# Patient Record
Sex: FEMALE | Race: WHITE | NOT HISPANIC OR LATINO | Employment: OTHER | URBAN - METROPOLITAN AREA
[De-identification: names, ages, dates, MRNs, and addresses within clinical notes are randomized per-mention and may not be internally consistent; named-entity substitution may affect disease eponyms.]

---

## 2023-11-09 ENCOUNTER — TREATMENT (OUTPATIENT)
Dept: PHYSICAL THERAPY | Facility: CLINIC | Age: 68
End: 2023-11-09
Payer: COMMERCIAL

## 2023-11-09 DIAGNOSIS — M54.40 CHRONIC BILATERAL LOW BACK PAIN WITH SCIATICA: ICD-10-CM

## 2023-11-09 DIAGNOSIS — M54.2 NECK PAIN: Primary | ICD-10-CM

## 2023-11-09 DIAGNOSIS — G89.29 CHRONIC BILATERAL LOW BACK PAIN WITH SCIATICA: ICD-10-CM

## 2023-11-09 PROCEDURE — 97110 THERAPEUTIC EXERCISES: CPT | Mod: GP

## 2023-11-09 PROCEDURE — 97164 PT RE-EVAL EST PLAN CARE: CPT | Mod: GP,59

## 2023-11-09 NOTE — PROGRESS NOTES
Physical Therapy Treatment    Patient Name: Megan Son  MRN: [id number]  Today's Date: 11/9/2023  Visit: 12/60 (1/5)    SUBJECTIVE:  Patient returns after vacation and time off due to insurance issues.  Plans to retire Feb 1st.  Pain= 6-7/10 on avg, increased with twisting or moving quickly.  Not constant pain.  Prone quad stretch is helping.  Standing in place= 5 min tolerance  Walking= at least 1 mile tolerance    OBJECTIVE:  Oswestry= 40% impaired    LE strength:  Flexion= 4+/4+  Extension= 3-/3-    Cervical AROM:  SB= 32/35  Rotatoin= 52/44    TREATMENT:  Therex:  Briefly reassessed for re-eval.  Scifit (Les only) x 6 min  HF stretch over EOB  Pelvic tilts   Abdominal bracing with marching    Prone PA glides to lumbar spine  Passive quad stretch bilat in prone  Prone press ups x 5 reps  GTB mid and low rows x 15 reps each  Multihip x 20 reps bilat  -plate 3 hip abd and extension    ASSESSMENT:   Patient is doing well overall with improved LBP although still present soila with sitting long periods.  Neck is much better (present but not as intense or noticeable).  Would benefit from PT to restart PT to further work on back mainly and neck to a lesser degree.    PLAN:    Will restart PT weekly for neck and LBP.

## 2023-11-16 ENCOUNTER — APPOINTMENT (OUTPATIENT)
Dept: PHYSICAL THERAPY | Facility: CLINIC | Age: 68
End: 2023-11-16
Payer: COMMERCIAL

## 2023-11-30 ENCOUNTER — TREATMENT (OUTPATIENT)
Dept: PHYSICAL THERAPY | Facility: CLINIC | Age: 68
End: 2023-11-30
Payer: COMMERCIAL

## 2023-11-30 DIAGNOSIS — G89.29 CHRONIC LEFT-SIDED LOW BACK PAIN WITHOUT SCIATICA: ICD-10-CM

## 2023-11-30 DIAGNOSIS — G89.29 CHRONIC BILATERAL LOW BACK PAIN WITH SCIATICA: ICD-10-CM

## 2023-11-30 DIAGNOSIS — M54.2 NECK PAIN: Primary | ICD-10-CM

## 2023-11-30 DIAGNOSIS — M54.40 CHRONIC BILATERAL LOW BACK PAIN WITH SCIATICA: ICD-10-CM

## 2023-11-30 DIAGNOSIS — M54.50 CHRONIC LEFT-SIDED LOW BACK PAIN WITHOUT SCIATICA: ICD-10-CM

## 2023-11-30 PROCEDURE — 97110 THERAPEUTIC EXERCISES: CPT | Mod: GP

## 2023-11-30 NOTE — PROGRESS NOTES
Physical Therapy Treatment    Patient Name: Megan Son  MRN: [id number]  Today's Date: 11/30/2023  Visit: 13/60 (2/5)    SUBJECTIVE:  Patient reports pain over L glute area, worsens with sitting.  Prone lying and quad stretching still help somewhat.    OBJECTIVE:  Hip shift to R in standing.    TREATMENT:  Therex:  Scifit (Les only) x 5 min  HF stretch in standing x 30 sec bilat    Mat Ex:  Isometric abdominals   LTR x 2 min  Prone PA glides to lumbar spine  Passive quad stretch bilat in prone  Prone press ups 2 x 5 reps  Quadruped alternating Ues x 10 reps  Quadruped LE extension x 10 reps  Bird dog x 10 reps  GTB mid and low rows x 15 reps each    ASSESSMENT:   Patient reports decreased pain after session.  Good response to extension.  Attempted side glides but increased pain, soila to L.  Encouraged to try to stand with increased neutral positioning.    PLAN:    Continue to address back with lumbar extension emphasis.

## 2023-12-07 ENCOUNTER — TREATMENT (OUTPATIENT)
Dept: PHYSICAL THERAPY | Facility: CLINIC | Age: 68
End: 2023-12-07
Payer: COMMERCIAL

## 2023-12-07 DIAGNOSIS — G89.29 CHRONIC LEFT-SIDED LOW BACK PAIN WITHOUT SCIATICA: ICD-10-CM

## 2023-12-07 DIAGNOSIS — M54.2 NECK PAIN: Primary | ICD-10-CM

## 2023-12-07 DIAGNOSIS — M54.50 CHRONIC LEFT-SIDED LOW BACK PAIN WITHOUT SCIATICA: ICD-10-CM

## 2023-12-07 PROCEDURE — 97110 THERAPEUTIC EXERCISES: CPT | Mod: GP

## 2023-12-07 NOTE — PROGRESS NOTES
Physical Therapy Treatment    Patient Name: Megan Son  MRN: [id number]  Today's Date: 12/7/2023  Visit: 14/60 (3/5)    SUBJECTIVE:  Patient reports today was not as bad as some days.  Has been able to find good reclined position when working on laptop.    OBJECTIVE:  Hip shift to R in standing.    TREATMENT:  Therex:  Scifit (Les only) x 5 min  Standing hamstring stretch x 1 min bilat  HF stretch in standing x 1 min bilat  Multihip x 15 reps   -plate 2 hip abduction, flexion and extension  GTB mid and low rows x 15 reps each    Mat Ex:  Isometric abdominals   LTR x 2 min  Prone PA glides to lumbar spine  Passive quad stretch bilat in prone  Prone press ups 2 x 5 reps    ASSESSMENT:  Patient appeared to tolerate exercises well and reports felt good with press ups     PLAN:    Continue to address back with lumbar extension emphasis.  Patient thinking of scheduling follow up with PCP some time in Jan due to insurance changes.

## 2023-12-14 ENCOUNTER — TREATMENT (OUTPATIENT)
Dept: PHYSICAL THERAPY | Facility: CLINIC | Age: 68
End: 2023-12-14
Payer: COMMERCIAL

## 2023-12-14 DIAGNOSIS — M54.50 CHRONIC LEFT-SIDED LOW BACK PAIN WITHOUT SCIATICA: ICD-10-CM

## 2023-12-14 DIAGNOSIS — M54.2 NECK PAIN: Primary | ICD-10-CM

## 2023-12-14 DIAGNOSIS — G89.29 CHRONIC LEFT-SIDED LOW BACK PAIN WITHOUT SCIATICA: ICD-10-CM

## 2023-12-14 PROCEDURE — 97110 THERAPEUTIC EXERCISES: CPT | Mod: GP

## 2023-12-14 NOTE — PROGRESS NOTES
Physical Therapy Treatment    Patient Name: Megan Son  MRN: [id number]  Today's Date: 12/14/2023  Visit: 15/60 (4/5)    SUBJECTIVE:  Patient reports pain is not too bad the last few days.  Usually has bilat anterior hip pain when fist getting up in the mornings, but didn't feel it one morning this week.      OBJECTIVE:  Hip shift to R in standing.  Hip flexion, IR, ER, abduction, and extension PROM= WNL bilat    TREATMENT:  Therex:  Scifit (Les only) x 5 min  Standing hamstring stretch x 1 min bilat  HF stretch in standing x 1 min bilat  Multihip x 15 reps   -plate 2 hip abduction, flexion and extension  BTB mid and low rows x 15 reps each  Instructed in and issued HF stretch over EOB    Mat Ex:  Isometric abdominals   LTR x 2 min  Pelvic tilts  Prone PA glides to lumbar spine  Passive quad stretch bilat in prone  Prone press ups 2 x 5 reps    ASSESSMENT:  Patient tolerated exercises well.  Pain fully resolved in prone    PLAN:   Patient thinking of scheduling follow up with PCP some time in Jan due to insurance changes.  Increase wt on multihip next visit.

## 2024-02-27 ENCOUNTER — APPOINTMENT (OUTPATIENT)
Dept: PHYSICAL THERAPY | Facility: CLINIC | Age: 69
End: 2024-02-27
Payer: MEDICARE

## 2024-02-29 ENCOUNTER — TREATMENT (OUTPATIENT)
Dept: PHYSICAL THERAPY | Facility: CLINIC | Age: 69
End: 2024-02-29
Payer: MEDICARE

## 2024-02-29 DIAGNOSIS — M54.2 NECK PAIN: ICD-10-CM

## 2024-02-29 DIAGNOSIS — M54.50 CHRONIC LEFT-SIDED LOW BACK PAIN WITHOUT SCIATICA: Primary | ICD-10-CM

## 2024-02-29 DIAGNOSIS — G89.29 CHRONIC LEFT-SIDED LOW BACK PAIN WITHOUT SCIATICA: Primary | ICD-10-CM

## 2024-02-29 PROCEDURE — 97140 MANUAL THERAPY 1/> REGIONS: CPT | Mod: GP

## 2024-02-29 PROCEDURE — 97110 THERAPEUTIC EXERCISES: CPT | Mod: GP

## 2024-02-29 NOTE — PROGRESS NOTES
Physical Therapy Treatment    Patient Name: Megan Son  MRN: [id number]  Today's Date: 2/29/2024  Visit: 16 (1 of new insurance- medicare)    SUBJECTIVE:  Patient reports just retired at the beginning of the month, so has had some insurance issues.  Patient reports was walking the day 3 days ago, tripped and fell.  Mostly fell on R leg and L arm.  Sore overall.  Compliant with HEP    OBJECTIVE:  Unable to sit in neutral, Shifts trunk to L away from painful side.  Hip shift to R in standing.    TREATMENT:  Therex:  Sidelying manual distraction at R iliac crest= no sx  Supine manual R leg pull  Supine manual piriformis, KTC and hip abduction stretching  Prone R manual quad stretch  PA glides to lumbar spine and soft tissue work to R LB/proximal glute area  Prone press ups x 5 reps  HF stretch over EOB    Encouraged to do LTR in the morning due to report of increased pain in mornings lately.    ASSESSMENT:  Patient reports improved sx after session.    PLAN:   Plans to get consult with Dr. Merritt at outside facility in near future.  Will gradually restart PT with increased frequency now that patient has new insurance.  Will concentrate on decreasing sx and improving core stability

## 2024-03-12 ENCOUNTER — TREATMENT (OUTPATIENT)
Dept: PHYSICAL THERAPY | Facility: CLINIC | Age: 69
End: 2024-03-12
Payer: MEDICARE

## 2024-03-12 DIAGNOSIS — G89.29 CHRONIC LEFT-SIDED LOW BACK PAIN WITHOUT SCIATICA: Primary | ICD-10-CM

## 2024-03-12 DIAGNOSIS — M54.2 NECK PAIN: ICD-10-CM

## 2024-03-12 DIAGNOSIS — M54.50 CHRONIC LEFT-SIDED LOW BACK PAIN WITHOUT SCIATICA: Primary | ICD-10-CM

## 2024-03-12 PROCEDURE — 97110 THERAPEUTIC EXERCISES: CPT | Mod: GP

## 2024-03-12 NOTE — PROGRESS NOTES
Physical Therapy Treatment    Patient Name: Megan Son  MRN: [id number]  Today's Date: 3/12/2024  Visit: 17 (2 of new insurance- medicare)    SUBJECTIVE:  Noticing tightness/achiness in the mornings.  Takes a while to get moving.    OBJECTIVE:  Hip shift to R in standing.    TREATMENT:  Therex:  Sidelying manual distraction at R iliac crest  Supine manual R leg pull  Supine manual piriformis, KTC and hip abduction stretching  Prone R manual quad stretch  PA glides to lumbar spine and soft tissue work to R LB/proximal glute area  Prone press ups x 5 reps  HF stretch over EOB R    ASSESSMENT:  Patient reports improved sx after session.    PLAN:   Add in core stabilization next visit.

## 2024-03-19 ENCOUNTER — TREATMENT (OUTPATIENT)
Dept: PHYSICAL THERAPY | Facility: CLINIC | Age: 69
End: 2024-03-19
Payer: MEDICARE

## 2024-03-19 DIAGNOSIS — M54.50 CHRONIC LEFT-SIDED LOW BACK PAIN WITHOUT SCIATICA: Primary | ICD-10-CM

## 2024-03-19 DIAGNOSIS — M54.2 NECK PAIN: ICD-10-CM

## 2024-03-19 DIAGNOSIS — G89.29 CHRONIC LEFT-SIDED LOW BACK PAIN WITHOUT SCIATICA: Primary | ICD-10-CM

## 2024-03-19 PROCEDURE — 97110 THERAPEUTIC EXERCISES: CPT | Mod: GP

## 2024-03-19 NOTE — PROGRESS NOTES
Physical Therapy Treatment    Patient Name: Megan Son  MRN: [id number]  Today's Date: 3/19/2024  Visit: 18 (3 of new insurance- medicare)    SUBJECTIVE:  Reports still gets sore/pain for the first 3 hours of the day.    OBJECTIVE:  Hip shift to R in standing.    TREATMENT:  Therex:  Scifit x 7 min  BTB mid and low rows x 15 reps each  Cable Column- 7.5# sidestepping x 5 reps L and R  Sidelying manual distraction at R iliac crest  Supine manual R leg pull  Supine manual piriformis  Pelvic tilts x 15 reps  Abdominal bracing with marching  Dead bug  Bridges    ASSESSMENT:  Patient reports improved sx after session.  Doc new exercises well.    PLAN:   Continue to progress core strengthening.

## 2024-03-22 ENCOUNTER — TREATMENT (OUTPATIENT)
Dept: PHYSICAL THERAPY | Facility: CLINIC | Age: 69
End: 2024-03-22
Payer: MEDICARE

## 2024-03-22 DIAGNOSIS — G89.29 CHRONIC BILATERAL LOW BACK PAIN WITH LEFT-SIDED SCIATICA: ICD-10-CM

## 2024-03-22 DIAGNOSIS — M54.50 CHRONIC LEFT-SIDED LOW BACK PAIN WITHOUT SCIATICA: Primary | ICD-10-CM

## 2024-03-22 DIAGNOSIS — M54.2 NECK PAIN: ICD-10-CM

## 2024-03-22 DIAGNOSIS — M54.42 CHRONIC BILATERAL LOW BACK PAIN WITH LEFT-SIDED SCIATICA: ICD-10-CM

## 2024-03-22 DIAGNOSIS — G89.29 CHRONIC LEFT-SIDED LOW BACK PAIN WITHOUT SCIATICA: Primary | ICD-10-CM

## 2024-03-22 PROCEDURE — 97110 THERAPEUTIC EXERCISES: CPT | Mod: GP

## 2024-03-22 NOTE — PROGRESS NOTES
Physical Therapy Treatment    Patient Name: Megan Son  MRN: [id number]  Today's Date: 3/22/2024  Visit: 19 (4 of new insurance- medicare)    SUBJECTIVE:  Still feels most pain in the mornings.    OBJECTIVE:  Hip shift to R in standing.  Challenged standing on R leg while kicking with L during hip abd on multihip    TREATMENT:  Therex:  Scifit x 8 min  Standing hamstring stretch x 1 min bilat  Standing HF stretch at step x 1min bilat  BTB mid and low rows x 15 reps each  Cable Column- 7.5# sidestepping x 5 reps L and R  Multihip x 15 reps bilat  -plate hip abd, flexion, and extension  Sidelying manual distraction at R iliac crest  Supine manual R leg pull  Supine manual piriformis    ASSESSMENT:  Patient reports improved sx after session.  Did well with addition of strengthening exercises.    PLAN:   Continue to progress core strengthening.

## 2024-03-26 ENCOUNTER — TREATMENT (OUTPATIENT)
Dept: PHYSICAL THERAPY | Facility: CLINIC | Age: 69
End: 2024-03-26
Payer: MEDICARE

## 2024-03-26 DIAGNOSIS — M54.42 CHRONIC BILATERAL LOW BACK PAIN WITH LEFT-SIDED SCIATICA: ICD-10-CM

## 2024-03-26 DIAGNOSIS — G89.29 CHRONIC BILATERAL LOW BACK PAIN WITH LEFT-SIDED SCIATICA: ICD-10-CM

## 2024-03-26 DIAGNOSIS — M54.2 NECK PAIN: Primary | ICD-10-CM

## 2024-03-26 PROCEDURE — 97110 THERAPEUTIC EXERCISES: CPT | Mod: GP

## 2024-03-26 NOTE — PROGRESS NOTES
Physical Therapy Treatment    Patient Name: Megan Son  MRN: [id number]  Today's Date: 3/26/2024  Visit: 20 (5 of new insurance- medicare)    SUBJECTIVE:  States always feels better after PT appointments.  Sx are better until the next morning.  Still feels most pain in the mornings.  Calves get very tight.    OBJECTIVE:  Hip shift to R in standing.    TREATMENT:  Therex:  Scifit x 8 min  Standing hamstring stretch x 1 min bilat  Standing HF stretch at step x 1min bilat  BTB mid and low rows x 15 reps each  Cable Column- 7.5# sidestepping x 5 reps L and R  Multihip x 15 reps bilat  -plate 2 hip abd, flexion, and extension  Instruction in use of GTB for 3 way hip  Prone passive R quad  Prone press ups x 5 reps  PA glides lumbar spine    Current HEP: pelvic tilts, LTR, prone lying, prone quad stretch, KTC, calf stretch  -added 3 way hip with GTB    ASSESSMENT:  Patient reports improved sx after session.  Feels more loose.    PLAN:   Continue to progress core strengthening.

## 2024-03-28 ENCOUNTER — TREATMENT (OUTPATIENT)
Dept: PHYSICAL THERAPY | Facility: CLINIC | Age: 69
End: 2024-03-28
Payer: MEDICARE

## 2024-03-28 DIAGNOSIS — G89.29 CHRONIC LEFT-SIDED LOW BACK PAIN WITHOUT SCIATICA: ICD-10-CM

## 2024-03-28 DIAGNOSIS — M54.42 CHRONIC BILATERAL LOW BACK PAIN WITH LEFT-SIDED SCIATICA: ICD-10-CM

## 2024-03-28 DIAGNOSIS — M54.2 NECK PAIN: Primary | ICD-10-CM

## 2024-03-28 DIAGNOSIS — G89.29 CHRONIC BILATERAL LOW BACK PAIN WITH LEFT-SIDED SCIATICA: ICD-10-CM

## 2024-03-28 DIAGNOSIS — M54.50 CHRONIC LEFT-SIDED LOW BACK PAIN WITHOUT SCIATICA: ICD-10-CM

## 2024-03-28 PROCEDURE — 97110 THERAPEUTIC EXERCISES: CPT | Mod: GP

## 2024-03-28 NOTE — PROGRESS NOTES
Physical Therapy Treatment    Patient Name: Megan Son  MRN: [id number]  Today's Date: 3/28/2024  Visit: 21 (5 of new insurance- medicare)    SUBJECTIVE:  States feels a little less pain overall last few days.    OBJECTIVE:  Hip shift to R in standing.    TREATMENT:  Therex:  Scifit x 8 min  Standing hamstring stretch x 1 min bilat  Standing HF stretch at step x 1min bilat  BTB mid and low rows x 15 reps each  Cable Column- 7.5# sidestepping x 5 reps L and R  Multihip x 15 reps bilat  -plate 2 hip abd, flexion, and extension  Prone passive R quad  Prone press ups x 5 reps  PA glides lumbar spine  Standing hip shifts L    Current HEP: pelvic tilts, LTR, prone lying, prone quad stretch, KTC, calf stretch  -added 3 way hip with GTB    ASSESSMENT:  No pain in prone after exercises    PLAN:   Continue to progress core strengthening.  Will follow up in 2 weeks, then 1x/week after that time.

## 2024-04-04 ENCOUNTER — TREATMENT (OUTPATIENT)
Dept: PHYSICAL THERAPY | Facility: CLINIC | Age: 69
End: 2024-04-04
Payer: MEDICARE

## 2024-04-04 DIAGNOSIS — M54.42 CHRONIC BILATERAL LOW BACK PAIN WITH LEFT-SIDED SCIATICA: ICD-10-CM

## 2024-04-04 DIAGNOSIS — M54.2 NECK PAIN: ICD-10-CM

## 2024-04-04 DIAGNOSIS — G89.29 CHRONIC BILATERAL LOW BACK PAIN WITH LEFT-SIDED SCIATICA: ICD-10-CM

## 2024-04-04 PROCEDURE — 97110 THERAPEUTIC EXERCISES: CPT | Mod: GP

## 2024-04-04 NOTE — PROGRESS NOTES
Physical Therapy Treatment    Patient Name: Megan Son  MRN: [id number]  Today's Date: 4/4/2024  Visit: 22 (6 of new insurance- medicare)    SUBJECTIVE:  States back has been feeling a little better recently.  Is tentative due to fearful pain will return.    OBJECTIVE:  Hip shift to R in standing.    TREATMENT:  Therex:  Scifit x 8 min  Standing hamstring stretch x 1 min bilat  Standing HF stretch at step x 1min bilat  BTB mid and low rows x 15 reps each  Cable Column- 7.5# sidestepping x 5 reps L and R  Multihip x 15 reps bilat  -plate 2 hip abd, flexion  -plate 3 hip extension  Discussed use and set up of home TENS unit.  Patient plans to purchase.    Current HEP: pelvic tilts, LTR, prone lying, prone quad stretch, KTC, calf stretch  -added 3 way hip with GTB    ASSESSMENT:  Patient with improved pain level overall    PLAN:   Continue to progress core strengthening.  Continue once a week.

## 2024-04-18 ENCOUNTER — TREATMENT (OUTPATIENT)
Dept: PHYSICAL THERAPY | Facility: CLINIC | Age: 69
End: 2024-04-18
Payer: MEDICARE

## 2024-04-18 DIAGNOSIS — M54.2 NECK PAIN: Primary | ICD-10-CM

## 2024-04-18 DIAGNOSIS — M54.42 CHRONIC BILATERAL LOW BACK PAIN WITH LEFT-SIDED SCIATICA: ICD-10-CM

## 2024-04-18 DIAGNOSIS — G89.29 CHRONIC BILATERAL LOW BACK PAIN WITH LEFT-SIDED SCIATICA: ICD-10-CM

## 2024-04-18 PROCEDURE — 97110 THERAPEUTIC EXERCISES: CPT | Mod: GP

## 2024-04-18 NOTE — PROGRESS NOTES
Physical Therapy Treatment    Patient Name: Megan Son  MRN: [id number]  Today's Date: 4/18/2024  Visit: 23 (7 of new insurance- medicare)    SUBJECTIVE:  Patient is trying to schedule appt with Dr. Merritt re: back pain and continued pain with sitting.    OBJECTIVE:  Hip shift to R in standing.    TREATMENT:  Therex:  Scifit x 6 min  Standing hamstring stretch x 1 min bilat  Standing HF stretch at step x 1min bilat  BTB mid and low rows x 15 reps each  Cable Column- 7.5# sidestepping x 5 reps L and R  Multihip x 15 reps bilat  -plate 2 hip abd, flexion  -plate 3 hip extension  L sidelying with pillow under waist:  -manual hip glides with and without mild rotation    Current HEP: pelvic tilts, LTR, prone lying, prone quad stretch, KTC, calf stretch  -added 3 way hip with GTB    ASSESSMENT:  Patient with good tolerance for exercises.  No increased pain during session.    PLAN:   Continue to progress core strengthening.  Continue once a week.

## 2024-04-25 ENCOUNTER — APPOINTMENT (OUTPATIENT)
Dept: PHYSICAL THERAPY | Facility: CLINIC | Age: 69
End: 2024-04-25
Payer: MEDICARE

## 2024-05-01 NOTE — PROGRESS NOTES
Physical Therapy Treatment    Patient Name: Megan Son  MRN: [id number]  Today's Date: 5/1/2024  Visit: 24 (8 of new insurance- medicare)    SUBJECTIVE:  Patient reports has appt with Dr. Merritt in 2 months.  Patient with pain down L leg last 2 weeks    OBJECTIVE:  Hip shift to R in standing.    TREATMENT:  Therex:  Scifit x 6 min  Standing hamstring stretch x 1 min bilat  Standing HF stretch at step x 1min bilat  BTB mid and low rows x 15 reps each  Cable Column- 7.5# sidestepping x 5 reps L and R  Multihip x 15 reps bilat  -plate 2 hip abd  -plate 3 hip extension, flexion  L sidelying with pillow under waist:  -manual hip glides with and without mild rotation  R sidelying: manual trunk rotation and distraction at iliac crest    Current HEP: pelvic tilts, LTR, prone lying, prone quad stretch, KTC, calf stretch  -added 3 way hip with GTB    ASSESSMENT:  Patient reports less tightness after visit.    PLAN:   Continue to progress core strengthening.  Continue once a week.

## 2024-05-02 ENCOUNTER — TREATMENT (OUTPATIENT)
Dept: PHYSICAL THERAPY | Facility: CLINIC | Age: 69
End: 2024-05-02
Payer: MEDICARE

## 2024-05-02 DIAGNOSIS — M54.42 CHRONIC BILATERAL LOW BACK PAIN WITH LEFT-SIDED SCIATICA: ICD-10-CM

## 2024-05-02 DIAGNOSIS — M54.2 NECK PAIN: Primary | ICD-10-CM

## 2024-05-02 DIAGNOSIS — G89.29 CHRONIC BILATERAL LOW BACK PAIN WITH LEFT-SIDED SCIATICA: ICD-10-CM

## 2024-05-02 PROCEDURE — 97110 THERAPEUTIC EXERCISES: CPT | Mod: GP

## 2024-05-10 ENCOUNTER — TREATMENT (OUTPATIENT)
Dept: PHYSICAL THERAPY | Facility: CLINIC | Age: 69
End: 2024-05-10
Payer: MEDICARE

## 2024-05-10 DIAGNOSIS — G89.29 CHRONIC BILATERAL LOW BACK PAIN WITH LEFT-SIDED SCIATICA: Primary | ICD-10-CM

## 2024-05-10 DIAGNOSIS — M54.2 NECK PAIN: ICD-10-CM

## 2024-05-10 DIAGNOSIS — M54.42 CHRONIC BILATERAL LOW BACK PAIN WITH LEFT-SIDED SCIATICA: Primary | ICD-10-CM

## 2024-05-10 PROCEDURE — 97110 THERAPEUTIC EXERCISES: CPT | Mod: GP

## 2024-05-10 NOTE — PROGRESS NOTES
Physical Therapy Treatment    Patient Name: Megan Son  MRN: [id number]  Today's Date: 5/10/2024  Visit: 25 (9 of new insurance- medicare)    SUBJECTIVE:  Patient reports has had a pretty good week.  Pain not as bad but is still present in leg.  Notices some increased leg sx when sits at night to watch TV.    OBJECTIVE:  Hip shift to R in standing.    TREATMENT:  Therex:  Scifit x 6 min  Encouraged use of lumbar roll when sitting in chair at night.  Standing hamstring stretch x 1 min bilat  Standing HF stretch at step x 1min bilat  BTB mid and low rows x 15 reps each  Cable Column- 7.5# sidestepping x 5 reps L and R  Multihip x 15 reps bilat  -plate 2 hip abd  -plate 3 hip extension, flexion  L sidelying with pillow under waist:  -manual hip glides with and without mild rotation  R sidelying: manual trunk rotation and distraction at iliac crest    Current HEP: pelvic tilts, LTR, prone lying, prone quad stretch, KTC, calf stretch  -added 3 way hip with GTB    ASSESSMENT:  Patient reports less tightness after visit.    PLAN:   Continue to progress core strengthening.  Will follow up when returns from vacation.  Recheck at that time.

## 2024-07-29 ENCOUNTER — DOCUMENTATION (OUTPATIENT)
Dept: PHYSICAL THERAPY | Facility: CLINIC | Age: 69
End: 2024-07-29
Payer: MEDICARE

## 2024-07-29 NOTE — PROGRESS NOTES
Physical Therapy    Discharge Summary    Name: Megan Son  MRN: [id number]  : 1955  Date: 24    Discharge Summary: PT    Discharge Information: Date of discharge 24, Date of last visit 5/10/24, Date of evaluation 23, Number of attended visits 25, Referred by Catarino Mckoy, and Referred for neck and LBP    Therapy Summary: Addressed pain with manual therapy, there ex for strength/stabilization, modalities, and establishment of HEP.    Discharge Status: At last appt, reported pain had been pretty good over the last week, but does still have sx in leg (soila with sitting)    Rehab Discharge Reason: Patient to continue with HEP.  Was going on vacation after last visit.

## 2024-08-07 ENCOUNTER — APPOINTMENT (OUTPATIENT)
Dept: RADIOLOGY | Facility: HOSPITAL | Age: 69
End: 2024-08-07
Payer: MEDICARE

## 2024-08-07 ENCOUNTER — HOSPITAL ENCOUNTER (EMERGENCY)
Facility: HOSPITAL | Age: 69
Discharge: HOME | End: 2024-08-07
Attending: STUDENT IN AN ORGANIZED HEALTH CARE EDUCATION/TRAINING PROGRAM
Payer: MEDICARE

## 2024-08-07 VITALS
RESPIRATION RATE: 16 BRPM | WEIGHT: 165 LBS | BODY MASS INDEX: 25.01 KG/M2 | OXYGEN SATURATION: 97 % | HEIGHT: 68 IN | HEART RATE: 67 BPM | SYSTOLIC BLOOD PRESSURE: 132 MMHG | DIASTOLIC BLOOD PRESSURE: 86 MMHG | TEMPERATURE: 98.6 F

## 2024-08-07 DIAGNOSIS — S82.892A CLOSED FRACTURE OF LEFT ANKLE, INITIAL ENCOUNTER: Primary | ICD-10-CM

## 2024-08-07 PROCEDURE — 73590 X-RAY EXAM OF LOWER LEG: CPT | Mod: LT

## 2024-08-07 PROCEDURE — 29515 APPLICATION SHORT LEG SPLINT: CPT

## 2024-08-07 PROCEDURE — 73610 X-RAY EXAM OF ANKLE: CPT | Mod: LEFT SIDE | Performed by: RADIOLOGY

## 2024-08-07 PROCEDURE — 73590 X-RAY EXAM OF LOWER LEG: CPT | Mod: LEFT SIDE | Performed by: RADIOLOGY

## 2024-08-07 PROCEDURE — 73610 X-RAY EXAM OF ANKLE: CPT | Mod: LT

## 2024-08-07 PROCEDURE — 99284 EMERGENCY DEPT VISIT MOD MDM: CPT | Mod: 25

## 2024-08-07 PROCEDURE — 2500000001 HC RX 250 WO HCPCS SELF ADMINISTERED DRUGS (ALT 637 FOR MEDICARE OP): Performed by: STUDENT IN AN ORGANIZED HEALTH CARE EDUCATION/TRAINING PROGRAM

## 2024-08-07 RX ORDER — HYDROCODONE BITARTRATE AND ACETAMINOPHEN 5; 325 MG/1; MG/1
1 TABLET ORAL EVERY 6 HOURS PRN
Qty: 12 TABLET | Refills: 0 | Status: SHIPPED | OUTPATIENT
Start: 2024-08-07 | End: 2024-08-08

## 2024-08-07 RX ORDER — HYDROCODONE BITARTRATE AND ACETAMINOPHEN 5; 325 MG/1; MG/1
1 TABLET ORAL ONCE
Status: COMPLETED | OUTPATIENT
Start: 2024-08-07 | End: 2024-08-07

## 2024-08-07 ASSESSMENT — PAIN SCALES - GENERAL
PAINLEVEL_OUTOF10: 5 - MODERATE PAIN
PAINLEVEL_OUTOF10: 5 - MODERATE PAIN

## 2024-08-07 ASSESSMENT — COLUMBIA-SUICIDE SEVERITY RATING SCALE - C-SSRS
2. HAVE YOU ACTUALLY HAD ANY THOUGHTS OF KILLING YOURSELF?: NO
1. IN THE PAST MONTH, HAVE YOU WISHED YOU WERE DEAD OR WISHED YOU COULD GO TO SLEEP AND NOT WAKE UP?: NO
6. HAVE YOU EVER DONE ANYTHING, STARTED TO DO ANYTHING, OR PREPARED TO DO ANYTHING TO END YOUR LIFE?: NO

## 2024-08-07 ASSESSMENT — PAIN DESCRIPTION - ORIENTATION: ORIENTATION: LEFT

## 2024-08-07 ASSESSMENT — PAIN - FUNCTIONAL ASSESSMENT: PAIN_FUNCTIONAL_ASSESSMENT: 0-10

## 2024-08-07 ASSESSMENT — PAIN DESCRIPTION - PAIN TYPE: TYPE: ACUTE PAIN

## 2024-08-07 ASSESSMENT — PAIN DESCRIPTION - LOCATION: LOCATION: ANKLE

## 2024-08-07 NOTE — ED PROVIDER NOTES
EMERGENCY MEDICINE EVALUATION NOTE    History of Present Illness     Chief Complaint:   Chief Complaint   Patient presents with    Ankle Pain     Left ankle injury       HPI: Megan Son is a 68 y.o. female who presents with complaint of ankle injury.  Patient states prior to arrival she fell off of a step and injured her left ankle.  States she fell onto a concrete pad onto her left ankle and rolled it inwards.  She does admit a deformity and severe pain overlying the lateral aspect of her left ankle with overlying contusion and ecchymosis.  She states she is unable to bear weight on the extremity due to pain.  She denies any additional injuries such as head trauma, loss of conscious, anticoagulant usage.  No other pain at this time.    Previous History   No past medical history on file.  No past surgical history on file.     No family history on file.  No Known Allergies  Current Outpatient Medications   Medication Instructions    HYDROcodone-acetaminophen (Norco) 5-325 mg tablet 1 tablet, oral, Every 6 hours PRN       Physical Exam     Appearance: Alert, oriented , cooperative,  in acute distress. Well nourished & well hydrated.     Skin: Intact,  dry skin, no lesions, rash, petechiae or purpura.      Eyes: PERRLA, EOMs intact,  Conjunctiva pink with no redness or exudates. Cornea & anterior chamber are clear, Eyelids without lesions. No scleral icterus.      ENT: Hearing grossly intact. External auditory canals patent, tympanic membranes intact with visible landmarks. Nares patent, mucus membranes moist. Dentition without lesions. Pharynx clear, uvula midline.      Neck: Supple, without meningismus. Thyroid not palpable. Trachea at midline. No lymphadenopathy.     Pulmonary: Clear bilaterally with good chest wall excursion. No rales, rhonchi or wheezing. No accessory muscle use or stridor.     Cardiac: Normal S1, S2 without murmur, rub, gallop or extrasystole. No JVD, Carotids without bruits.     Abdomen:  Soft, nontender, active bowel sounds.  No palpable organomegaly.  No rebound or guarding.  No CVA tenderness.     Genitourinary: Exam deferred.     Musculoskeletal: Noted deformity to the left ankle which is inverted with significant edema and ecchymosis as well as tenderness throughout the lateral malleolus. Pulses full and equal. No cyanosis or clubbing.      Neurological:  Cranial nerves II through XII are grossly intact, finger-nose touch is normal, normal sensation, no weakness, no focal findings identified.     Psychiatric: Appropriate mood and affect.      Results   Labs Reviewed - No data to display  XR tibia fibula left 2 views   Final Result   1. Avulsion fracture at the dorsal aspect of the talus.   2. Calcific density at the lateral aspect of the calcaneus, avulsion   fracture cannot be excluded. Please correlate with point tenderness.   3. Soft tissue swelling at the dorsum of the visualized left foot and   at the lateral aspect of the left ankle.                  MACRO:   None.        Signed by: Subha Cortes 8/7/2024 6:33 PM   Dictation workstation:   [id number]      XR ankle left 3+ views   Final Result   1. Avulsion fracture at the dorsal aspect of the talus.   2. Calcific density at the lateral aspect of the calcaneus, avulsion   fracture cannot be excluded. Please correlate with point tenderness.   3. Soft tissue swelling at the dorsum of the visualized left foot and   at the lateral aspect of the left ankle.                  MACRO:   None.        Signed by: Subha Cortes 8/7/2024 6:33 PM   Dictation workstation:   [id number]            ED Course & Medical Decision Making     Medications   HYDROcodone-acetaminophen (Norco) 5-325 mg per tablet 1 tablet (1 tablet oral Given 8/7/24 1803)     Diagnoses as of 08/07/24 1951   Closed fracture of left ankle, initial encounter     Heart Rate:  [67]   Temperature:  [37 °C (98.6 °F)]   Respirations:  [16]   BP: (132)/(86)   Height:  [172.7 cm (5'  "8\")]   Weight:  [74.8 kg (165 lb)]   Pulse Ox:  [97 %]      Patient did have a reported mechanical fall and inversion injury of the left ankle.  On examination she does have a deformity noted to the lateral malleolus as well as tenderness throughout and ecchymosis as well as edema.  Otherwise neurovascular intact.  She is hemodynamically stable and afebrile.  Concern for possible acute osseous abnormality or ligamentous injury and sprain.  She was provided Norco for pain control.  X-ray of the left ankle did show to avulsion fractures of the dorsal aspect of the talus as well as the lateral aspect of the calcaneus with some significant soft tissue swelling noted.  No significant displacement and tibia/fibula x-ray was nonacute.  She was informed to not bear any weight on the extremity until she is seen by orthopedic surgery and was placed in a posterior left ankle short splint by the medic at bedside.  Distal perfusion was adequate.  She was given crutches for ambulatory support.  Also given 3 days of [place] and orthopedic surgery referral.    Procedures   Procedures    Diagnosis     1. Closed fracture of left ankle, initial encounter        Disposition     DISCHARGE.  The patient is discharged back to their place of residence.  Discharge diagnosis, instructions and plan were discussed and understood. At the time of discharge the patient was comfortable and was in no apparent distress. Patient is aware of diagnostic uncertainty and was notified though testing is negative here, there is a very small chance that pathology may be missed.  The patient understands these risks and the patient /family understood to return immediately to the emergency department if the symptoms worsen or if they have any additional concerns.    FOLLOW UP  Primary care provider in 1-2 days.        ED Prescriptions       Medication Sig Dispense Start Date End Date Auth. Provider    HYDROcodone-acetaminophen (Norco) 5-325 mg tablet Take 1 " tablet by mouth every 6 hours if needed for moderate pain (4 - 6) for up to 3 days. 12 tablet 8/7/2024 8/10/2024 DO Jamie Murrell DO  08/07/24 1951

## 2024-08-07 NOTE — DISCHARGE INSTRUCTIONS
You have been seen at a [facility].  Please follow-up with your primary care provider in the next 1 to 2 days for further evaluation and routine follow-up.  Please return to the emergency room if having any worsening symptoms.  Please follow-up with any specialists if discussed during your emergency room stay.

## 2024-08-08 RX ORDER — HYDROCODONE BITARTRATE AND ACETAMINOPHEN 5; 325 MG/1; MG/1
1 TABLET ORAL EVERY 6 HOURS PRN
Qty: 12 TABLET | Refills: 0 | Status: SHIPPED | OUTPATIENT
Start: 2024-08-08 | End: 2024-08-11

## 2024-08-09 ENCOUNTER — HOSPITAL ENCOUNTER (OUTPATIENT)
Dept: RADIOLOGY | Facility: CLINIC | Age: 69
Discharge: HOME | End: 2024-08-09
Payer: MEDICARE

## 2024-08-09 ENCOUNTER — OFFICE VISIT (OUTPATIENT)
Dept: ORTHOPEDIC SURGERY | Facility: CLINIC | Age: 69
End: 2024-08-09
Payer: MEDICARE

## 2024-08-09 VITALS — HEIGHT: 68 IN | BODY MASS INDEX: 25.01 KG/M2 | WEIGHT: 165 LBS

## 2024-08-09 DIAGNOSIS — M25.572 LEFT ANKLE PAIN, UNSPECIFIED CHRONICITY: Primary | ICD-10-CM

## 2024-08-09 DIAGNOSIS — S82.892A CLOSED FRACTURE OF LEFT ANKLE, INITIAL ENCOUNTER: ICD-10-CM

## 2024-08-09 DIAGNOSIS — M25.561 RIGHT KNEE PAIN, UNSPECIFIED CHRONICITY: ICD-10-CM

## 2024-08-09 DIAGNOSIS — M25.572 LEFT ANKLE PAIN, UNSPECIFIED CHRONICITY: ICD-10-CM

## 2024-08-09 PROCEDURE — 99204 OFFICE O/P NEW MOD 45 MIN: CPT | Performed by: ORTHOPAEDIC SURGERY

## 2024-08-09 PROCEDURE — 1159F MED LIST DOCD IN RCRD: CPT | Performed by: ORTHOPAEDIC SURGERY

## 2024-08-09 PROCEDURE — 73610 X-RAY EXAM OF ANKLE: CPT | Mod: LT

## 2024-08-09 PROCEDURE — 3008F BODY MASS INDEX DOCD: CPT | Performed by: ORTHOPAEDIC SURGERY

## 2024-08-09 PROCEDURE — 73562 X-RAY EXAM OF KNEE 3: CPT | Mod: RT

## 2024-08-09 RX ORDER — TRAMADOL HYDROCHLORIDE 50 MG/1
50 TABLET ORAL EVERY 8 HOURS PRN
Qty: 28 TABLET | Refills: 0 | Status: SHIPPED | OUTPATIENT
Start: 2024-08-09

## 2024-08-09 NOTE — PROGRESS NOTES
Chief complaint is left ankle and right knee injury she twisted her ankle and banged her right knee seen in urgent care splinted  Moderate discomfort  No prior injuries to these areas  Past medical,family and social histories have been reviewed and are up to date.  All other body systems have been reviewed and are negative for complaint.  Constitutional: Well-developed well-nourished   Eyes: Sclerae anicteric, pupils equal and round  HENT: Normocephalic atraumatic  Cardiovascular: Pulses full, regular rate and rhythm  Respiratory: Breathing not labored, no wheezing  Integumentary: Skin intact, no lesions or rashes  Neurological: Sensation intact, no gross strength deficits, reflexes equal  Psychiatric: Alert oriented and appropriate  Hematologic/lymphatic: No lymphadenopathy  Right knee: Anterior bruise small effusion no instability extensor mechanism intact  Left ankle quite swollen and bruised tender laterally and anteriorly Achilles intact  X-rays are reviewed knee negative ankle avulsion fracture mortise intact assessment sprains contusion plan supportive care fracture boot ice elevation wrap follow-up 2 to 3 weeks as needed  Ultram for pain

## 2024-08-19 ENCOUNTER — APPOINTMENT (OUTPATIENT)
Dept: ORTHOPEDIC SURGERY | Facility: CLINIC | Age: 69
End: 2024-08-19
Payer: MEDICARE

## 2024-08-20 ENCOUNTER — APPOINTMENT (OUTPATIENT)
Dept: ORTHOPEDIC SURGERY | Facility: CLINIC | Age: 69
End: 2024-08-20
Payer: MEDICARE

## 2024-08-20 VITALS — HEIGHT: 68 IN | BODY MASS INDEX: 25.01 KG/M2 | WEIGHT: 165 LBS

## 2024-08-20 DIAGNOSIS — M25.572 LEFT ANKLE PAIN, UNSPECIFIED CHRONICITY: ICD-10-CM

## 2024-08-20 DIAGNOSIS — S82.892A CLOSED FRACTURE OF LEFT ANKLE, INITIAL ENCOUNTER: ICD-10-CM

## 2024-08-20 ASSESSMENT — PAIN SCALES - GENERAL: PAINLEVEL_OUTOF10: 8

## 2024-08-20 ASSESSMENT — PAIN - FUNCTIONAL ASSESSMENT: PAIN_FUNCTIONAL_ASSESSMENT: 0-10

## 2024-08-20 ASSESSMENT — PAIN DESCRIPTION - DESCRIPTORS: DESCRIPTORS: SHARP

## 2024-08-20 NOTE — PROGRESS NOTES
Follow-up ankle injury and knee contusion  Ankle still quite sore she has been in a walking boot  The knee is better but still sore  No change in interval medical history  Examination she is awake alert oriented appropriate she is still quite swollen she is tender laterally and medially in the ankle her knee is  on the medial femoral condyle but there is no effusion full mobility no instability assessment resolved ankle and knee injuries plan transition to [facility] physical therapy referral compression stocking follow-up 4 weeks as needed

## 2024-09-20 ENCOUNTER — APPOINTMENT (OUTPATIENT)
Dept: ORTHOPEDIC SURGERY | Facility: CLINIC | Age: 69
End: 2024-09-20
Payer: MEDICARE

## 2024-09-20 VITALS — BODY MASS INDEX: 25.01 KG/M2 | WEIGHT: 165 LBS | HEIGHT: 68 IN

## 2024-09-20 DIAGNOSIS — M25.561 RIGHT KNEE PAIN, UNSPECIFIED CHRONICITY: ICD-10-CM

## 2024-09-20 PROCEDURE — 3008F BODY MASS INDEX DOCD: CPT | Performed by: ORTHOPAEDIC SURGERY

## 2024-09-20 PROCEDURE — 1125F AMNT PAIN NOTED PAIN PRSNT: CPT | Performed by: ORTHOPAEDIC SURGERY

## 2024-09-20 PROCEDURE — 1159F MED LIST DOCD IN RCRD: CPT | Performed by: ORTHOPAEDIC SURGERY

## 2024-09-20 PROCEDURE — 99213 OFFICE O/P EST LOW 20 MIN: CPT | Performed by: ORTHOPAEDIC SURGERY

## 2024-09-20 ASSESSMENT — PAIN SCALES - GENERAL: PAINLEVEL_OUTOF10: 7

## 2024-09-20 ASSESSMENT — PAIN - FUNCTIONAL ASSESSMENT: PAIN_FUNCTIONAL_ASSESSMENT: 0-10

## 2024-09-20 ASSESSMENT — PAIN DESCRIPTION - DESCRIPTORS: DESCRIPTORS: SHARP

## 2024-09-20 NOTE — PROGRESS NOTES
Follow-up right knee her ankle is much better but her knee is quite sore medially intermittent locking catching pain is fairly positional  She has tried activity modification anti-inflammatories and a gentle home exercise program and symptoms persist she has not had the symptoms prior to her injury    Past medical,family and social histories have been reviewed and are up to date.  All other body systems have been reviewed and are negative for complaint.  Constitutional: Well-developed well-nourished   Eyes: Sclerae anicteric, pupils equal and round  HENT: Normocephalic atraumatic  Cardiovascular: Pulses full, regular rate and rhythm  Respiratory: Breathing not labored, no wheezing  Integumentary: Skin intact, no lesions or rashes  Neurological: Sensation intact, no gross strength deficits, reflexes equal  Psychiatric: Alert oriented and appropriate  Hematologic/lymphatic: No lymphadenopathy  Right knee: No mass or angular deformity, small effusion, tender medial joint line, pause Christoph's, positive squat test, full range of motion    Impression persistent knee pain despite an adequate trial of nonoperative treatment recommend MRI rule out meniscal pathology          6 weeks of knee pain following injury suspect medial meniscus tear  Check MRI

## 2024-09-27 ENCOUNTER — HOSPITAL ENCOUNTER (OUTPATIENT)
Dept: RADIOLOGY | Facility: CLINIC | Age: 69
Discharge: HOME | End: 2024-09-27
Payer: MEDICARE

## 2024-09-27 DIAGNOSIS — M25.561 RIGHT KNEE PAIN, UNSPECIFIED CHRONICITY: ICD-10-CM

## 2024-09-27 PROCEDURE — 73721 MRI JNT OF LWR EXTRE W/O DYE: CPT | Mod: RT

## 2024-10-03 NOTE — PROGRESS NOTES
Physical Therapy Evaluation    Subjective:  Patient Name: Megan Son  MRN: [id number]  Today's Date: 10/6/2024  Visit: 1/MN  Referred by: Linda  Time Calculation  Start Time: 830  Stop Time: 915  Time Calculation (min): 45 min  Diagnosis: R knee pain, L ankle pain  Mechanism of Injury:  Patient reports fell off a step and rolled ankle inward.  Went to ED, dx with ankle avulsion fx.  Also injured knee during the fall.  Was originally put on crutches in ED, then boot by ortho, then aircast.  Now ankle feels swollen and weak, R knee is more painful than ankle now.  Recent MRI shows MCL sprain.  Location: R medial knee  Pain Ratin/10  Increased pain: at night, wt bearing    Current Medical Management:  -x-ray ankle:  1. Avulsion fracture at the dorsal aspect of the talus.   2. Calcific density at the lateral aspect of the calcaneus, avulsion   fracture cannot be excluded.    MRI knee:  1. High-grade sprain at the proximal attachment of the medial  collateral ligament with full-thickness tear in the anterior aspect.  2. Nondisplaced fracture of the posterior aspect of the lateral  tibial plateau with cortical step-off. Osseous contusion of the  posterolateral femoral condyle.  3. Moderate patellofemoral and mild medial compartment osteoarthrosis  with chondral loss as above.    Precautions: weaning from aircast L (LBP)  Functional Limitations: limited walking distance, slower gait, stairs (step to sideways), sit to stand from toilet  Prior Level of Function: indep with IADLs  Work Status: retired  Living Environment: no issues  Social Support:   Personal Factors that may impact care:  none    Objective:    Gait: Amb without device, but with antalgic gait due to R knee pain.  Educated on use of cane to unload knee and decrease pain.  Gait less antalgic with cane.    Palpation: TTP R medial knee  Mild swelling L ankle    LE Strength (R/L):  Hip flexion= 4/4+  Knee flexion= 4+/5  Knee extension= 5/5  L  Ankle:  -DF= 5  -Ever= 4+  -Inver= 5  -PF= 5 NWB    LE ROM/Flexibility (R/L):  Knee flexion= R= 131  Knee extension= R= 0  Hip flexion=WNL  Ankle DF= 8  PF= 48  Inv= 25  Ever= 10    Special Tests:  FAAM= 70% of usual daily activity    Treatment Performed Today:  Instructed in and issued for HEP:  Passive DF stretch, 4 way resisted ankle with band, SLR, s/l hip abduction    Assessment:  69 yo F s/p L ankle avulsion fx (also with MCL sprain) following a fall.  Would benefit from PT to address both ankle and knee pain, LE strength, gait, and function.   . Low complexity due to patient's clinical presentation being stable and uncomplicated by any significant comorbidities that may affect rehab tolerance and progression.    Clinical presentation:  Stable and/or uncomplicated characteristics  Problem List:  Knee and ankle pain, decreased LE strength, impaired gait, decreased function.  Level of Complexity:  low  Plan:  -Goals:    -Frequency & Duration:   1x/week x 6-8 weeks  -Rehab Potential:   good  Plan of care was developed with input and agreement of the patient.    Billing:  PT Evaluation Time Entry  PT Evaluation (Low) Time Entry: 30  PT Therapeutic Procedures Time Entry  Therapeutic Exercise Time Entry: 15

## 2024-10-04 ENCOUNTER — EVALUATION (OUTPATIENT)
Dept: PHYSICAL THERAPY | Facility: CLINIC | Age: 69
End: 2024-10-04
Payer: MEDICARE

## 2024-10-04 DIAGNOSIS — S82.892A CLOSED FRACTURE OF LEFT ANKLE, INITIAL ENCOUNTER: Primary | ICD-10-CM

## 2024-10-04 DIAGNOSIS — M25.561 ACUTE PAIN OF RIGHT KNEE: ICD-10-CM

## 2024-10-04 DIAGNOSIS — M25.572 LEFT ANKLE PAIN, UNSPECIFIED CHRONICITY: ICD-10-CM

## 2024-10-04 PROCEDURE — 97161 PT EVAL LOW COMPLEX 20 MIN: CPT | Mod: GP

## 2024-10-04 PROCEDURE — 97110 THERAPEUTIC EXERCISES: CPT | Mod: GP

## 2024-10-06 PROBLEM — M25.572 LEFT ANKLE PAIN: Status: ACTIVE | Noted: 2024-10-06

## 2024-10-06 PROBLEM — M25.561 RIGHT KNEE PAIN: Status: ACTIVE | Noted: 2024-10-06

## 2024-10-06 PROBLEM — S82.892A CLOSED FRACTURE OF LEFT ANKLE: Status: ACTIVE | Noted: 2024-10-06

## 2024-10-06 PROBLEM — M25.561 RIGHT KNEE PAIN: Status: RESOLVED | Noted: 2024-10-06 | Resolved: 2024-10-06

## 2024-10-06 PROBLEM — M25.561 RIGHT KNEE PAIN: Chronic | Status: ACTIVE | Noted: 2024-10-06

## 2024-10-06 ASSESSMENT — ENCOUNTER SYMPTOMS
DEPRESSION: 0
OCCASIONAL FEELINGS OF UNSTEADINESS: 0
LOSS OF SENSATION IN FEET: 0

## 2024-10-09 ENCOUNTER — TREATMENT (OUTPATIENT)
Dept: PHYSICAL THERAPY | Facility: CLINIC | Age: 69
End: 2024-10-09
Payer: MEDICARE

## 2024-10-09 DIAGNOSIS — M25.561 ACUTE PAIN OF RIGHT KNEE: ICD-10-CM

## 2024-10-09 DIAGNOSIS — S82.892A CLOSED FRACTURE OF LEFT ANKLE, INITIAL ENCOUNTER: Primary | ICD-10-CM

## 2024-10-09 DIAGNOSIS — M25.572 LEFT ANKLE PAIN, UNSPECIFIED CHRONICITY: ICD-10-CM

## 2024-10-09 PROCEDURE — 97110 THERAPEUTIC EXERCISES: CPT | Mod: GP

## 2024-10-09 NOTE — PROGRESS NOTES
Physical Therapy Treatment    Patient Name: Megan Son  MRN: [id number]  Today's Date: 10/9/2024   Visit 2/MN  Time Calculation  Start Time: 0830  Stop Time: 0915  Time Calculation (min): 45 min  Dx: R knee pain, L ankle pain    PRECAUTIONS: weaning from L aircast, LBP    SUBJECTIVE:  More R knee pain today, always worse in mornings.  Pain level: 0/10 pain L ankle but swollen,  < 5/10 R knee  Compliant with HEP? yes    OBJECTIVE:  L ankle DF= 15  R knee flexion = 131    TREATMENT:  - Therex:   Scifit (LE only) x 6 min  Gastroc and soleus stretch on slantboard x 1 min each  L SLS  Reviewed and performed HEP:  -4 way ankle with GTB x 15 reps each  Passive L DF stretch  R QS x 15 reps  R heel slides    Current HEP:  Passive DF stretch, 4 way resisted ankle with band, SLR, s/l hip abduction. Added SLS L, QS, and wall calf stretch    ASSESSMENT:   Patient with much improved ankle ROM.  Most limited by knee pain, not ankle at present  EDUCATION:  Correct technique with HEP  PLAN:    Continue with ankle and knee ROM, LE strength, gait and pain management.    Billing:     PT Therapeutic Procedures Time Entry  Therapeutic Exercise Time Entry: 45

## 2024-10-17 ENCOUNTER — APPOINTMENT (OUTPATIENT)
Dept: ORTHOPEDIC SURGERY | Facility: CLINIC | Age: 69
End: 2024-10-17
Payer: MEDICARE

## 2024-10-17 DIAGNOSIS — M25.561 RIGHT KNEE PAIN, UNSPECIFIED CHRONICITY: Primary | ICD-10-CM

## 2024-10-17 NOTE — PROGRESS NOTES
Follow-up knee injury much better she is a couple months out no prior knee problems  Currently getting therapy for her ankle  MRIs reviewed which shows a nondisplaced tibial plateau fracture old collateral ligament injury no meniscal pathology and arthritis she has no effusion today she has full range of motion no instability we went over the MRI in detail recommended watchful waiting consider cortisone injection a couple months if symptoms persist

## 2024-10-18 ENCOUNTER — TREATMENT (OUTPATIENT)
Dept: PHYSICAL THERAPY | Facility: CLINIC | Age: 69
End: 2024-10-18
Payer: MEDICARE

## 2024-10-18 DIAGNOSIS — M25.572 LEFT ANKLE PAIN, UNSPECIFIED CHRONICITY: ICD-10-CM

## 2024-10-18 DIAGNOSIS — S82.892A CLOSED FRACTURE OF LEFT ANKLE, INITIAL ENCOUNTER: Primary | ICD-10-CM

## 2024-10-18 DIAGNOSIS — M25.561 ACUTE PAIN OF RIGHT KNEE: ICD-10-CM

## 2024-10-18 PROCEDURE — 97530 THERAPEUTIC ACTIVITIES: CPT | Mod: GP

## 2024-10-18 NOTE — PROGRESS NOTES
Physical Therapy Treatment    Patient Name: Megan Sno  MRN: [id number]  Today's Date: 10/18/2024   Visit 3/MN  Time Calculation  Start Time: 0830  Stop Time: 0915  Time Calculation (min): 45 min  Dx: R knee pain, L ankle pain    PRECAUTIONS: weaning from L aircast, LBP    SUBJECTIVE:  Patient had follow up with Dr Mckeon.  Does not recommend surgery.  Recommend cortisone injection in a couple months if not resolved.  Pain level: 0/10 pain L ankle,  4/10 R knee with legs straight in bed and sit to stand  Compliant with HEP? yes    OBJECTIVE:  Improved gait quality    TREATMENT:  - Therex:   Scifit (LE only) x 7 min  Gastroc and soleus stretch on slantboard x 1 min each  Rockerboard- AP and LR, static and dynamic  1/2 foam- AP balance  Heel and toe raises x 20 reps each  L SLS  BAPS L3 on L x 1 min each:  -DF/PF, inver/ever, CW/CCW    Current HEP:  Passive DF stretch, 4 way resisted ankle with band, SLR, s/l hip abduction. Added SLS L, QS, and wall calf stretch    ASSESSMENT:   Patient tolerated all exercises well without increased pain  EDUCATION:  Correct technique with HEP  PLAN:    Continue with ankle and knee ROM, LE strength, gait and pain management.    Billing:

## 2024-10-24 ENCOUNTER — TREATMENT (OUTPATIENT)
Dept: PHYSICAL THERAPY | Facility: CLINIC | Age: 69
End: 2024-10-24
Payer: MEDICARE

## 2024-10-24 DIAGNOSIS — M54.42 CHRONIC BILATERAL LOW BACK PAIN WITH LEFT-SIDED SCIATICA: ICD-10-CM

## 2024-10-24 DIAGNOSIS — M25.572 LEFT ANKLE PAIN, UNSPECIFIED CHRONICITY: ICD-10-CM

## 2024-10-24 DIAGNOSIS — M54.2 NECK PAIN: ICD-10-CM

## 2024-10-24 DIAGNOSIS — M25.561 ACUTE PAIN OF RIGHT KNEE: Primary | ICD-10-CM

## 2024-10-24 DIAGNOSIS — G89.29 CHRONIC BILATERAL LOW BACK PAIN WITH LEFT-SIDED SCIATICA: ICD-10-CM

## 2024-10-24 PROCEDURE — 97110 THERAPEUTIC EXERCISES: CPT | Mod: GP

## 2024-10-30 ENCOUNTER — TREATMENT (OUTPATIENT)
Dept: PHYSICAL THERAPY | Facility: CLINIC | Age: 69
End: 2024-10-30
Payer: MEDICARE

## 2024-10-30 DIAGNOSIS — M25.561 ACUTE PAIN OF RIGHT KNEE: Primary | ICD-10-CM

## 2024-10-30 DIAGNOSIS — M25.572 LEFT ANKLE PAIN, UNSPECIFIED CHRONICITY: ICD-10-CM

## 2024-10-30 DIAGNOSIS — G89.29 CHRONIC BILATERAL LOW BACK PAIN WITH LEFT-SIDED SCIATICA: ICD-10-CM

## 2024-10-30 DIAGNOSIS — M54.42 CHRONIC BILATERAL LOW BACK PAIN WITH LEFT-SIDED SCIATICA: ICD-10-CM

## 2024-10-30 DIAGNOSIS — M54.2 NECK PAIN: ICD-10-CM

## 2024-10-30 PROCEDURE — 97110 THERAPEUTIC EXERCISES: CPT | Mod: GP

## 2024-11-05 ENCOUNTER — TREATMENT (OUTPATIENT)
Dept: PHYSICAL THERAPY | Facility: CLINIC | Age: 69
End: 2024-11-05
Payer: MEDICARE

## 2024-11-05 DIAGNOSIS — M25.561 ACUTE PAIN OF RIGHT KNEE: Primary | ICD-10-CM

## 2024-11-05 DIAGNOSIS — M54.42 CHRONIC BILATERAL LOW BACK PAIN WITH LEFT-SIDED SCIATICA: ICD-10-CM

## 2024-11-05 DIAGNOSIS — M54.2 NECK PAIN: ICD-10-CM

## 2024-11-05 DIAGNOSIS — G89.29 CHRONIC BILATERAL LOW BACK PAIN WITH LEFT-SIDED SCIATICA: ICD-10-CM

## 2024-11-05 DIAGNOSIS — M25.572 LEFT ANKLE PAIN, UNSPECIFIED CHRONICITY: ICD-10-CM

## 2024-11-05 PROCEDURE — 97110 THERAPEUTIC EXERCISES: CPT | Mod: GP

## 2024-11-05 NOTE — PROGRESS NOTES
Physical Therapy Treatment    Patient Name: Megan Son  MRN: [id number]  Today's Date: 11/5/2024   Visit 6/MN  Time Calculation  Start Time: 1030  Stop Time: 1115  Time Calculation (min): 45 min  Dx: R knee pain, L ankle pain    PRECAUTIONS: LBP    SUBJECTIVE:  Knee pain same as last visit (4/10).  Sx worst in mornings, improves as day progresses.  Was able to walk 1 mile on level trail without increased pain  Compliant with HEP? yes    OBJECTIVE:  Able to balance AP on 1/2 foam without UE support for first time    TREATMENT:  - Therex:   Scifit (LE only) x 6 min  Gastroc and soleus stretch on slantboard x 1 min each  Rockerboard- AP and LR, dynamic x 1 min each  1/2 foam- AP balance x 1 min  1/2 foam- tandem x 1 min  Heel and toe raises x 20 reps each  Cone taps at various angles in SLS L (without UE support)  Total Gym (stopper at 5)  -2x15 reps squats  Mat Ex:  -SLR R x 20 reps  -s/l hip aduction 2x10 reps  -clamshells 2x10 reps    Current HEP:  Passive DF stretch, 4 way resisted ankle with band, SLR, s/l hip abduction. Added SLS L, QS, and wall calf stretch    ASSESSMENT:  No increased pain during session  EDUCATION:  Continued HEP, sit to stand technique to avoid compensation, knee ROM before sit to  morning  PLAN:    Continue with ankle and knee ROM, LE strength, gait and pain management.  Will hope to be done mid November  Billing:     PT Therapeutic Procedures Time Entry  Therapeutic Exercise Time Entry: 45

## 2024-11-13 ENCOUNTER — TREATMENT (OUTPATIENT)
Dept: PHYSICAL THERAPY | Facility: CLINIC | Age: 69
End: 2024-11-13
Payer: MEDICARE

## 2024-11-13 DIAGNOSIS — M54.42 CHRONIC BILATERAL LOW BACK PAIN WITH LEFT-SIDED SCIATICA: ICD-10-CM

## 2024-11-13 DIAGNOSIS — S82.892A CLOSED FRACTURE OF LEFT ANKLE, INITIAL ENCOUNTER: ICD-10-CM

## 2024-11-13 DIAGNOSIS — M25.561 ACUTE PAIN OF RIGHT KNEE: Primary | ICD-10-CM

## 2024-11-13 DIAGNOSIS — M54.2 NECK PAIN: ICD-10-CM

## 2024-11-13 DIAGNOSIS — M25.572 LEFT ANKLE PAIN, UNSPECIFIED CHRONICITY: ICD-10-CM

## 2024-11-13 DIAGNOSIS — G89.29 CHRONIC BILATERAL LOW BACK PAIN WITH LEFT-SIDED SCIATICA: ICD-10-CM

## 2024-11-13 PROCEDURE — 97110 THERAPEUTIC EXERCISES: CPT | Mod: GP

## 2024-11-13 NOTE — PROGRESS NOTES
Physical Therapy Treatment    Patient Name: Megan Son  MRN: [id number]  Today's Date: 11/13/2024   Visit 6/MN  Time Calculation  Start Time: 0830  Stop Time: 0915  Time Calculation (min): 45 min  Dx: R knee pain, L ankle pain    PRECAUTIONS: LBP    SUBJECTIVE:  Knee pain similar to last visit (3/10).  Doesn't notice knee pain when moving and busy, but is more noticeable when at home or sometimes at night  Feels ankle is almost back to normal.    Compliant with HEP? yes    OBJECTIVE:  Decreased UE support needed during balance exercises    TREATMENT:  - Therex:   Scifit (LE only) x 6 min  Gastroc and soleus stretch on slantboard x 1 min each  Rockerboard- AP and LR, static and dynamic x 1 min each  1/2 foam- AP balance x 1 min  1/2 foam- tandem x 1 min  Heel and toe raises x 20 reps each  Cone taps at various angles in SLS L (without UE support)  Total Gym (stopper at 5)  -2x15 reps squats  Mat Ex:  -SLR R 2x10 reps  -s/l hip aduction 2x10 reps  -clamshells 2x10 reps    Current HEP:  Passive DF stretch, 4 way resisted ankle with band, SLR, s/l hip abduction. Added SLS L, QS, and wall calf stretch    ASSESSMENT:  No increased pain during session  EDUCATION:  Continued HEP    PLAN:   Continue with ankle and knee ROM, LE strength, gait and pain management.  May bring in home TENS unit for instruction next visit.  Will likely DC next visit.  Billing:     PT Therapeutic Procedures Time Entry  Therapeutic Exercise Time Entry: 45

## 2024-11-20 ENCOUNTER — TREATMENT (OUTPATIENT)
Dept: PHYSICAL THERAPY | Facility: CLINIC | Age: 69
End: 2024-11-20
Payer: MEDICARE

## 2024-11-20 DIAGNOSIS — S82.892A CLOSED FRACTURE OF LEFT ANKLE, INITIAL ENCOUNTER: ICD-10-CM

## 2024-11-20 DIAGNOSIS — G89.29 CHRONIC BILATERAL LOW BACK PAIN WITH LEFT-SIDED SCIATICA: ICD-10-CM

## 2024-11-20 DIAGNOSIS — M25.572 LEFT ANKLE PAIN, UNSPECIFIED CHRONICITY: ICD-10-CM

## 2024-11-20 DIAGNOSIS — M54.2 NECK PAIN: ICD-10-CM

## 2024-11-20 DIAGNOSIS — M25.561 ACUTE PAIN OF RIGHT KNEE: Primary | ICD-10-CM

## 2024-11-20 DIAGNOSIS — M54.42 CHRONIC BILATERAL LOW BACK PAIN WITH LEFT-SIDED SCIATICA: ICD-10-CM

## 2024-11-20 PROCEDURE — 97110 THERAPEUTIC EXERCISES: CPT | Mod: GP

## 2024-11-20 NOTE — PROGRESS NOTES
Physical Therapy Treatment    Patient Name: Megan Son  MRN: [id number]  Today's Date: 11/20/2024   Visit 7/MN  Time Calculation  Start Time: 1015  Stop Time: 1100  Time Calculation (min): 45 min  Dx: R knee pain, L ankle pain    PRECAUTIONS: LBP    SUBJECTIVE:  Patient states knee is a little better (2.5/10)    Feels ankle is almost back to normal.  Slows gait to be cautious at times.    Compliant with HEP? yes    OBJECTIVE:  Decreased UE support needed during balance exercises  L ankle strength= 5/5 throughout  Hip Flexion= 5/5  Knee Flexion= 5/5  DF ROM= 11 AROM  Eversion ROM= 15    TREATMENT:  - Therex:   Scifit (LE only) x 6 min  Calf stretch on slantboard x 1 min each  Rockerboard- AP and LR  dynamic x 1 min each  1/2 foam- AP balance x 1 min  1/2 foam- tandem x 1 min  SLS  1/2 foam- Heel and toe raises x 20 reps each  Total Gym (stopper at 5)  -2x15 reps squats  Rechecked for DC summary.  See objective section for details.    Current HEP:  Passive DF stretch, 4 way resisted ankle with band, SLR, s/l hip abduction, SLS L, QS, and wall calf stretch    ASSESSMENT:  Patient with good improvement in pain and function regarding ankle sx.  She does have continued although improved knee pain.  Plans to follow up with  in December for this.  EDUCATION:  Educated on correct set up and use of home TENS unit.    PLAN:   DC to indep HEP.    Billing:     PT Therapeutic Procedures Time Entry  Therapeutic Exercise Time Entry: 45